# Patient Record
Sex: MALE | Race: WHITE | NOT HISPANIC OR LATINO | Employment: OTHER | URBAN - METROPOLITAN AREA
[De-identification: names, ages, dates, MRNs, and addresses within clinical notes are randomized per-mention and may not be internally consistent; named-entity substitution may affect disease eponyms.]

---

## 2021-07-04 ENCOUNTER — HOSPITAL ENCOUNTER (EMERGENCY)
Facility: MEDICAL CENTER | Age: 46
End: 2021-07-05
Attending: EMERGENCY MEDICINE
Payer: COMMERCIAL

## 2021-07-04 DIAGNOSIS — S61.012A LACERATION OF LEFT THUMB WITHOUT FOREIGN BODY WITHOUT DAMAGE TO NAIL, INITIAL ENCOUNTER: ICD-10-CM

## 2021-07-04 PROCEDURE — 99282 EMERGENCY DEPT VISIT SF MDM: CPT | Mod: EDC

## 2021-07-04 PROCEDURE — 303747 HCHG EXTRA SUTURE: Mod: EDC

## 2021-07-04 PROCEDURE — 304999 HCHG REPAIR-SIMPLE/INTERMED LEVEL 1: Mod: EDC

## 2021-07-04 RX ORDER — LIDOCAINE HYDROCHLORIDE 10 MG/ML
20 INJECTION, SOLUTION INFILTRATION; PERINEURAL ONCE
Status: COMPLETED | OUTPATIENT
Start: 2021-07-05 | End: 2021-07-05

## 2021-07-05 VITALS
HEIGHT: 72 IN | RESPIRATION RATE: 18 BRPM | WEIGHT: 200.62 LBS | OXYGEN SATURATION: 96 % | SYSTOLIC BLOOD PRESSURE: 115 MMHG | DIASTOLIC BLOOD PRESSURE: 67 MMHG | BODY MASS INDEX: 27.17 KG/M2 | TEMPERATURE: 97.6 F | HEART RATE: 65 BPM

## 2021-07-05 PROCEDURE — 304999 HCHG REPAIR-SIMPLE/INTERMED LEVEL 1: Mod: EDC

## 2021-07-05 PROCEDURE — 303747 HCHG EXTRA SUTURE: Mod: EDC

## 2021-07-05 PROCEDURE — 700101 HCHG RX REV CODE 250: Performed by: EMERGENCY MEDICINE

## 2021-07-05 RX ADMIN — LIDOCAINE HYDROCHLORIDE 3 ML: 10 INJECTION, SOLUTION INFILTRATION; PERINEURAL at 00:00

## 2021-07-05 NOTE — ED PROVIDER NOTES
ED Provider Note    CHIEF COMPLAINT  Laceration    HPI  Erasmo Gonzales is a 46 y.o. male who presents to the emergency department for evaluation of a laceration.  Patient states that he was cutting a screen with a knife in his kitchen just prior to arrival.  The knife slipped and he cut the volar aspect of his left thumb.  He states that he does have diminished sensation over the tip of the finger pad on the thumb.  He denies any other injuries.  He is uncertain of when his last tetanus was.  He is right-hand dominant.  He did wash the wound out briefly under the water.  He denies any other signs or symptoms include modalities to runny nose, cough, congestion, or difficulty breathing.  He denies chest pain, shortness of breath, nausea, vomiting, or abdominal pain.    REVIEW OF SYSTEMS  See HPI for further details. All other systems are negative.     PAST MEDICAL HISTORY  None    SOCIAL HISTORY  Social History     Tobacco Use   • Smoking status: Never Smoker   • Smokeless tobacco: Never Used   Vaping Use   • Vaping Use: Never used   Substance and Sexual Activity   • Alcohol use: Not Currently   • Drug use: Not Currently   • Sexual activity: Not on file       SURGICAL HISTORY   has a past surgical history that includes achilles tendon repair (Left).    CURRENT MEDICATIONS  Home Medications     Reviewed by Kathy Patel R.N. (Registered Nurse) on 07/04/21 at 2258  Med List Status: Complete   Medication Last Dose Status        Patient Kaleb Taking any Medications                       ALLERGIES  No Known Allergies    PHYSICAL EXAM  VITAL SIGNS: /84   Pulse 68   Temp 36.4 °C (97.6 °F) (Temporal)   Resp 18   Ht 1.829 m (6')   Wt 91 kg (200 lb 9.9 oz)   SpO2 97%   BMI 27.21 kg/m²   Constitutional: Alert in no apparent distress.  HENT: Normocephalic, Atraumatic, Bilateral external ears normal. Nose normal.   Eyes: Pupils are equal and reactive. Conjunctiva normal, non-icteric.   Heart: Regular rate and  rythm, no murmurs, gallops or rubs.    Lungs: Clear to auscultation bilaterally. No wheezing, rhonchi, or rales.  Skin: Warm and dry.  He has a 1.5 cm linear laceration over the volar aspect of the left thumb just distal to the MCP.  Sensation is diminished over the tip of the finger pad.  Cap refill is less than 2 seconds.  The patient has full flexion and extension against resistance at the IP and MCP.  Neurologic: Alert. Patient moves all four extremities and follows commands  Psychiatric: Affect normal, Judgment normal, Mood normal, Appears appropriate and not intoxicated.     Laceration Repair Procedure    Indication: Laceration    Location/Description: 1.5 cm linear laceration over the volar aspect of the left thumb just distal to the MCP    Procedure: The patient was placed in the appropriate position and anesthesia around the laceration was obtained with a full digital block of the left thumb using 1% Lidocaine without epinephrine. The area was then irrigated with normal saline. The laceration was closed with 4-0 Ethilon using interrupted sutures. There were no additional lacerations requiring repair. The wound area was then dressed with bacitracin, gauze and vaseline soaked gauze.      Total repaired wound length: 1.5 cm.     Other Items: Suture count: 5    The patient tolerated the procedure well.    Complications: None    COURSE & MEDICAL DECISION MAKING  Pertinent Labs & Imaging studies reviewed. (See chart for details)    This is a 46-year-old male presenting to emergency department for evaluation of a laceration.  On initial evaluation, the patient did not appear to be in any acute distress.  His vital signs were normal.  Physical exam was reassuring but notable for 1.5 cm linear laceration on the volar aspect of the left thumb.  His tendons were intact and he had good distal perfusion.  He did have some diminished sensation over the tip of the finger pad and may have had some nerve damage.  He was  uncertain when his last tetanus was but refused an update here in the emergency department tonight.  The wound was repaired.  Please see note above.  He tolerated this well with no immediate complications.  He is stable for discharge and encouraged him to follow-up with his primary care physician in 10 days for suture removal.  He will return to the ED with any worsening signs or symptoms.    FINAL IMPRESSION  1. Laceration of left thumb without foreign body without damage to nail, initial encounter        PRESCRIPTIONS  New Prescriptions    No medications on file     FOLLOW UP  Follow-up with your primary care physician in 10 days for suture removal.          [facility], Emergency Dept  [address] [place] [id number]  [phone]  Go to   As needed      -DISCHARGE-    Electronically signed by: Megan Knapp D.O., 7/4/2021 11:34 PM

## 2021-07-05 NOTE — ED TRIAGE NOTES
Pt to ED with complaint of laceration to left thumb with a kitchen knife. Bleeding controlled with dressing CMS intact to thumb. Tetanus not update.     Pt educated on ED process and asked to wait in lobby. Patient educated on importance of alerting staff to new or worsening symptoms or concerns.

## 2021-07-15 ENCOUNTER — HOSPITAL ENCOUNTER (EMERGENCY)
Facility: MEDICAL CENTER | Age: 46
End: 2021-07-15

## 2021-07-15 VITALS
TEMPERATURE: 97.5 F | DIASTOLIC BLOOD PRESSURE: 77 MMHG | HEART RATE: 64 BPM | RESPIRATION RATE: 14 BRPM | SYSTOLIC BLOOD PRESSURE: 131 MMHG | OXYGEN SATURATION: 98 %

## 2021-07-15 PROCEDURE — 302449 STATCHG TRIAGE ONLY (STATISTIC)
